# Patient Record
Sex: MALE | Race: WHITE | NOT HISPANIC OR LATINO | Employment: OTHER | URBAN - METROPOLITAN AREA
[De-identification: names, ages, dates, MRNs, and addresses within clinical notes are randomized per-mention and may not be internally consistent; named-entity substitution may affect disease eponyms.]

---

## 2024-07-03 ENCOUNTER — TELEPHONE (OUTPATIENT)
Dept: GASTROENTEROLOGY | Facility: MEDICAL CENTER | Age: 64
End: 2024-07-03

## 2024-07-03 ENCOUNTER — OFFICE VISIT (OUTPATIENT)
Dept: GASTROENTEROLOGY | Facility: MEDICAL CENTER | Age: 64
End: 2024-07-03
Payer: COMMERCIAL

## 2024-07-03 VITALS — WEIGHT: 207.6 LBS

## 2024-07-03 DIAGNOSIS — Z12.11 SCREENING FOR MALIGNANT NEOPLASM OF COLON: Primary | ICD-10-CM

## 2024-07-03 DIAGNOSIS — K58.0 IRRITABLE BOWEL SYNDROME WITH DIARRHEA: ICD-10-CM

## 2024-07-03 DIAGNOSIS — K21.9 GASTROESOPHAGEAL REFLUX DISEASE, UNSPECIFIED WHETHER ESOPHAGITIS PRESENT: ICD-10-CM

## 2024-07-03 PROCEDURE — 99204 OFFICE O/P NEW MOD 45 MIN: CPT | Performed by: STUDENT IN AN ORGANIZED HEALTH CARE EDUCATION/TRAINING PROGRAM

## 2024-07-03 RX ORDER — BUPROPION HYDROCHLORIDE 300 MG/1
300 TABLET ORAL DAILY
COMMUNITY

## 2024-07-03 RX ORDER — LAMOTRIGINE 200 MG/1
200 TABLET ORAL DAILY
COMMUNITY

## 2024-07-03 RX ORDER — FINASTERIDE 1 MG/1
1 TABLET, FILM COATED ORAL DAILY
COMMUNITY

## 2024-07-03 RX ORDER — DULOXETIN HYDROCHLORIDE 30 MG/1
30 CAPSULE, DELAYED RELEASE ORAL DAILY
COMMUNITY

## 2024-07-03 RX ORDER — ATORVASTATIN CALCIUM 20 MG/1
20 TABLET, FILM COATED ORAL DAILY
COMMUNITY

## 2024-07-03 RX ORDER — CLONAZEPAM 0.5 MG/1
0.5 TABLET ORAL 2 TIMES DAILY
COMMUNITY

## 2024-07-03 NOTE — PROGRESS NOTES
[facility] - Outpatient Consultation  Juni Torres 63 y.o. male MRN: [id number]  Encounter: [id number]      Assessment and Plan:    1. Screening for malignant neoplasm of colon    2. Gastroesophageal reflux disease, unspecified whether esophagitis present    3. Irritable bowel syndrome with diarrhea        63 y.o. male w/ hx of JULIET, anxiety, depression who was referred to GI for screening colonoscopy.    Patient is at average risk for CRC and is due for screening colonoscopy.  Risks and benefits were discussed, and we will proceed.    Patient's lifelong abdominal cramping and diarrhea is most consistent with IBS-D, but I will rule out celiac disease for now.    He does not have a strong indication for EGD at this time, but he does admit to longstanding GERD (which is well-controlled at this time).  He does have a few risk factors he does have a few risk factors for Hernandez's, so we will proceed with EGD at the same time as colonoscopy for Hernandez's screening.    - EGD, colonoscopy, Clenpiq  - Celiac serologies    Orders Placed This Encounter   Procedures    Tissue transglutaminase, IgA    IgA    EGD    Colonoscopy     ______________________________________________________________________    History of Present Illness:    Juni Torres is a 63 y.o. male w/ hx of JULIET, anxiety, depression who was referred to GI for screening colonoscopy.    Patient reports a sensitive stomach characterized by frequent cramping and diarrhea after trigger foods (such as tomato sauce).  As long as he avoids such foods, his bowels tend to be normal.  Denies hematochezia, melena, unintentional weight loss.    He also reports longstanding GERD for which he used to use Zantac until it was taken off the market.  Symptoms seem relatively well-controlled with lifestyle changes.  Denies dysphagia, odynophagia, nausea, or vomiting.    No prior EGD.  Last colonoscopy about 10 years ago which was reportedly normal.  No  family history of colon cancer.      Review of Systems:  As per HPI. Otherwise negative.      Historical Information   History reviewed. No pertinent past medical history.  History reviewed. No pertinent surgical history.  Social History   Social History     Substance and Sexual Activity   Alcohol Use Yes    Comment: social     Social History     Substance and Sexual Activity   Drug Use Not on file     Social History     Tobacco Use   Smoking Status Never   Smokeless Tobacco Never     History reviewed. No pertinent family history.    Meds/Allergies       Current Outpatient Medications:     atorvastatin (LIPITOR) 20 mg tablet    buPROPion (WELLBUTRIN XL) 300 mg 24 hr tablet    clonazePAM (KlonoPIN) 0.5 mg tablet    DULoxetine (CYMBALTA) 30 mg delayed release capsule    finasteride (PROPECIA) 1 MG tablet    lamoTRIgine (LaMICtal) 200 MG tablet    sodium picosulfate, magnesium oxide, citric acid (Clenpiq) oral solution    No Known Allergies        Objective     Weight 94.2 kg (207 lb 9.6 oz). There is no height or weight on file to calculate BMI.        Physical Exam:      General: No acute distress  Abdomen: Soft, non-tender, non-distended, normoactive bowel sounds  Neuro: Awake, alert, oriented x 3    Lab Results:   Lab Results   Component Value Date/Time    SODIUM 141 12/15/2023 10:34 AM    K 4.5 12/15/2023 10:34 AM     12/15/2023 10:34 AM    CO2 26 12/15/2023 10:34 AM    BUN 18 12/15/2023 10:34 AM    CREATININE 1.02 12/15/2023 10:34 AM    AST 24 12/15/2023 10:34 AM    ALT 31 12/15/2023 10:34 AM    ALKPHOS 73 12/15/2023 10:34 AM    TBILI 1.0 12/15/2023 10:34 AM    ALB 4.7 12/15/2023 10:34 AM

## 2024-07-03 NOTE — TELEPHONE ENCOUNTER
Procedure: EGD/Colonoscopy  Date: 08/02/2024  Physician performing: Dr. Linares  Location of procedure:  [facility]  Instructions given to patient: Yes  Diabetic: No  Clearances: N/A

## 2024-07-19 ENCOUNTER — ANESTHESIA (OUTPATIENT)
Dept: ANESTHESIOLOGY | Facility: HOSPITAL | Age: 64
End: 2024-07-19

## 2024-07-19 ENCOUNTER — TELEPHONE (OUTPATIENT)
Dept: GASTROENTEROLOGY | Facility: MEDICAL CENTER | Age: 64
End: 2024-07-19

## 2024-07-19 ENCOUNTER — ANESTHESIA EVENT (OUTPATIENT)
Dept: ANESTHESIOLOGY | Facility: HOSPITAL | Age: 64
End: 2024-07-19

## 2024-07-19 NOTE — TELEPHONE ENCOUNTER
Confirming Upcoming Procedure: Colonoscopy & EGD on August 2  Physician performing: Dr. Linares  Location of procedure:  [facility]  Prep: [facility]

## 2024-08-02 ENCOUNTER — HOSPITAL ENCOUNTER (OUTPATIENT)
Dept: GASTROENTEROLOGY | Facility: MEDICAL CENTER | Age: 64
Setting detail: OUTPATIENT SURGERY
End: 2024-08-02
Payer: COMMERCIAL

## 2024-08-02 ENCOUNTER — ANESTHESIA (OUTPATIENT)
Dept: GASTROENTEROLOGY | Facility: MEDICAL CENTER | Age: 64
End: 2024-08-02

## 2024-08-02 ENCOUNTER — ANESTHESIA EVENT (OUTPATIENT)
Dept: GASTROENTEROLOGY | Facility: MEDICAL CENTER | Age: 64
End: 2024-08-02

## 2024-08-02 VITALS
DIASTOLIC BLOOD PRESSURE: 67 MMHG | WEIGHT: 198 LBS | RESPIRATION RATE: 18 BRPM | BODY MASS INDEX: 30.01 KG/M2 | SYSTOLIC BLOOD PRESSURE: 117 MMHG | HEART RATE: 84 BPM | HEIGHT: 68 IN | OXYGEN SATURATION: 95 % | TEMPERATURE: 97.1 F

## 2024-08-02 DIAGNOSIS — Z12.11 SCREENING FOR MALIGNANT NEOPLASM OF COLON: ICD-10-CM

## 2024-08-02 DIAGNOSIS — K21.9 GASTROESOPHAGEAL REFLUX DISEASE, UNSPECIFIED WHETHER ESOPHAGITIS PRESENT: ICD-10-CM

## 2024-08-02 PROBLEM — M25.511 CHRONIC RIGHT SHOULDER PAIN: Status: ACTIVE | Noted: 2018-06-05

## 2024-08-02 PROBLEM — F41.1 GAD (GENERALIZED ANXIETY DISORDER): Status: ACTIVE | Noted: 2023-11-08

## 2024-08-02 PROBLEM — Z79.899 MEDICAL MARIJUANA USE: Status: ACTIVE | Noted: 2023-02-28

## 2024-08-02 PROBLEM — G89.29 CHRONIC RIGHT SHOULDER PAIN: Status: ACTIVE | Noted: 2018-06-05

## 2024-08-02 PROBLEM — F10.90 ALCOHOL USE: Status: ACTIVE | Noted: 2022-10-24

## 2024-08-02 PROBLEM — G47.33 MILD OBSTRUCTIVE SLEEP APNEA: Status: ACTIVE | Noted: 2024-02-08

## 2024-08-02 PROBLEM — Z78.9 ALCOHOL USE: Status: ACTIVE | Noted: 2022-10-24

## 2024-08-02 PROBLEM — F33.1 MODERATE EPISODE OF RECURRENT MAJOR DEPRESSIVE DISORDER (HCC): Status: ACTIVE | Noted: 2023-11-08

## 2024-08-02 PROCEDURE — G0121 COLON CA SCRN NOT HI RSK IND: HCPCS | Performed by: STUDENT IN AN ORGANIZED HEALTH CARE EDUCATION/TRAINING PROGRAM

## 2024-08-02 PROCEDURE — 43239 EGD BIOPSY SINGLE/MULTIPLE: CPT | Performed by: STUDENT IN AN ORGANIZED HEALTH CARE EDUCATION/TRAINING PROGRAM

## 2024-08-02 PROCEDURE — 88305 TISSUE EXAM BY PATHOLOGIST: CPT | Performed by: PATHOLOGY

## 2024-08-02 RX ORDER — SODIUM CHLORIDE 9 MG/ML
INJECTION, SOLUTION INTRAVENOUS CONTINUOUS PRN
Status: DISCONTINUED | OUTPATIENT
Start: 2024-08-02 | End: 2024-08-02

## 2024-08-02 RX ORDER — SODIUM CHLORIDE 9 MG/ML
125 INJECTION, SOLUTION INTRAVENOUS CONTINUOUS
Status: CANCELLED | OUTPATIENT
Start: 2024-08-02

## 2024-08-02 RX ORDER — PROPOFOL 10 MG/ML
INJECTION, EMULSION INTRAVENOUS CONTINUOUS PRN
Status: DISCONTINUED | OUTPATIENT
Start: 2024-08-02 | End: 2024-08-02

## 2024-08-02 RX ORDER — IBUPROFEN 200 MG
200 TABLET ORAL EVERY 6 HOURS PRN
COMMUNITY

## 2024-08-02 RX ORDER — PROPOFOL 10 MG/ML
INJECTION, EMULSION INTRAVENOUS AS NEEDED
Status: DISCONTINUED | OUTPATIENT
Start: 2024-08-02 | End: 2024-08-02

## 2024-08-02 RX ORDER — FENTANYL CITRATE 50 UG/ML
INJECTION, SOLUTION INTRAMUSCULAR; INTRAVENOUS AS NEEDED
Status: DISCONTINUED | OUTPATIENT
Start: 2024-08-02 | End: 2024-08-02

## 2024-08-02 RX ORDER — SODIUM CHLORIDE 9 MG/ML
125 INJECTION, SOLUTION INTRAVENOUS CONTINUOUS
Status: SHIPPED | OUTPATIENT
Start: 2024-08-02

## 2024-08-02 RX ORDER — LIDOCAINE HYDROCHLORIDE 20 MG/ML
INJECTION, SOLUTION EPIDURAL; INFILTRATION; INTRACAUDAL; PERINEURAL AS NEEDED
Status: DISCONTINUED | OUTPATIENT
Start: 2024-08-02 | End: 2024-08-02

## 2024-08-02 RX ADMIN — SODIUM CHLORIDE 125 ML/HR: 0.9 INJECTION, SOLUTION INTRAVENOUS at 10:48

## 2024-08-02 RX ADMIN — PROPOFOL 150 MG: 10 INJECTION, EMULSION INTRAVENOUS at 10:57

## 2024-08-02 RX ADMIN — PROPOFOL 50 MG: 10 INJECTION, EMULSION INTRAVENOUS at 11:02

## 2024-08-02 RX ADMIN — SODIUM CHLORIDE: 0.9 INJECTION, SOLUTION INTRAVENOUS at 10:57

## 2024-08-02 RX ADMIN — PROPOFOL 20 MG: 10 INJECTION, EMULSION INTRAVENOUS at 11:15

## 2024-08-02 RX ADMIN — PROPOFOL 200 MCG/KG/MIN: 10 INJECTION, EMULSION INTRAVENOUS at 11:02

## 2024-08-02 RX ADMIN — LIDOCAINE HYDROCHLORIDE 100 MG: 20 INJECTION, SOLUTION EPIDURAL; INFILTRATION; INTRACAUDAL at 10:57

## 2024-08-02 RX ADMIN — FENTANYL CITRATE 25 MCG: 50 INJECTION INTRAMUSCULAR; INTRAVENOUS at 10:54

## 2024-08-02 NOTE — H&P
History and Physical - [facility]  Juni Torres 63 y.o. male MRN: [id number]          HPI: Juni Torres is a 63 y.o. year old male who presents for EGD/colonoscopy to evaluate GERD/Hernandez's screening and CRC screening, respectively.    Last colonoscopy 10 years ago.      REVIEW OF SYSTEMS: Per the HPI, and otherwise unremarkable.    Historical Information   No past medical history on file.  No past surgical history on file.  Social History   Social History     Substance and Sexual Activity   Alcohol Use Yes    Comment: social     Social History     Substance and Sexual Activity   Drug Use Not on file     Social History     Tobacco Use   Smoking Status Never   Smokeless Tobacco Never     No family history on file.    Meds/Allergies       Current Outpatient Medications:     atorvastatin (LIPITOR) 20 mg tablet    buPROPion (WELLBUTRIN XL) 300 mg 24 hr tablet    clonazePAM (KlonoPIN) 0.5 mg tablet    DULoxetine (CYMBALTA) 30 mg delayed release capsule    finasteride (PROPECIA) 1 MG tablet    lamoTRIgine (LaMICtal) 200 MG tablet    sodium picosulfate, magnesium oxide, citric acid (Clenpiq) oral solution    No Known Allergies    Objective     There were no vitals taken for this visit.      PHYSICAL EXAM    GEN: NAD  CARDIO: RRR  PULM: CTA bilaterally  ABD: soft, non-tender, non-distended  EXT: no lower extremity edema  NEURO: AAOx3      ASSESSMENT/PLAN:  63 y.o. year old male here for EGD/colonoscopy; he is stable and optimized for his procedure.

## 2024-08-02 NOTE — ANESTHESIA PREPROCEDURE EVALUATION
"Procedure:  EGD  COLONOSCOPY     - denies any chest pain, palpitations, shortness of breath, syncope, lightheadedness, seizures   - denies any recent infectious symptoms such as fevers, chills, cough   - denies taking any anticoagulation medications or any issues with bleeding, bruising, clotting    Relevant Problems   ANESTHESIA (within normal limits)      CARDIO (within normal limits)      ENDO (within normal limits)      GI/HEPATIC (within normal limits)      /RENAL (within normal limits)      GYN (within normal limits)      HEMATOLOGY (within normal limits)      MUSCULOSKELETAL (within normal limits)      NEURO/PSYCH (within normal limits)   (+) EDUARDO (generalized anxiety disorder)      PULMONARY (within normal limits)   (+) Mild obstructive sleep apnea      Behavioral Health   (+) Alcohol use   (+) Medical marijuana use          No results found for: \"WBC\", \"HGB\", \"HCT\", \"MCV\", \"PLT\"\  Lab Results   Component Value Date    SODIUM 141 12/15/2023    K 4.5 12/15/2023     12/15/2023    CO2 26 12/15/2023    AGAP 11 12/15/2023    BUN 18 12/15/2023    CREATININE 1.02 12/15/2023    GLUC 105 (H) 12/15/2023    CALCIUM 9.5 12/15/2023    AST 24 12/15/2023    ALT 31 12/15/2023    ALKPHOS 73 12/15/2023    TP 7.0 12/15/2023    TBILI 1.0 12/15/2023    EGFR 81 12/15/2023     No results found for: \"PTT\"  No results found for: \"INR\", \"PROTIME\"    Physical Exam    Airway    Mallampati score: II  TM Distance: >3 FB  Neck ROM: full     Dental       Cardiovascular  Rhythm: regular, Rate: normal, Cardiovascular exam normal    Pulmonary  Pulmonary exam normal Breath sounds clear to auscultation    Other Findings        Anesthesia Plan  ASA Score- 2     Anesthesia Type- IV sedation with anesthesia with ASA Monitors.         Additional Monitors:     Airway Plan:            Plan Factors-Exercise tolerance (METS): >4 METS.    Chart reviewed. EKG reviewed. Imaging results reviewed. Existing labs reviewed. Patient summary " reviewed.    Patient is not a current smoker.  Patient did not smoke on day of surgery.    Obstructive sleep apnea risk education given perioperatively.        Induction- intravenous.    Postoperative Plan-         Informed Consent- Anesthetic plan and risks discussed with patient.  I personally reviewed this patient with the CRNA. Discussed and agreed on the Anesthesia Plan with the CRNA..

## 2024-08-08 PROCEDURE — 88305 TISSUE EXAM BY PATHOLOGIST: CPT | Performed by: PATHOLOGY

## 2024-12-03 ENCOUNTER — OFFICE VISIT (OUTPATIENT)
Dept: GASTROENTEROLOGY | Facility: MEDICAL CENTER | Age: 64
End: 2024-12-03
Payer: COMMERCIAL

## 2024-12-03 VITALS
TEMPERATURE: 98.3 F | DIASTOLIC BLOOD PRESSURE: 82 MMHG | OXYGEN SATURATION: 98 % | HEIGHT: 68 IN | WEIGHT: 200.2 LBS | HEART RATE: 97 BPM | BODY MASS INDEX: 30.34 KG/M2 | SYSTOLIC BLOOD PRESSURE: 130 MMHG

## 2024-12-03 DIAGNOSIS — K58.0 IRRITABLE BOWEL SYNDROME WITH DIARRHEA: Primary | ICD-10-CM

## 2024-12-03 PROCEDURE — 99214 OFFICE O/P EST MOD 30 MIN: CPT | Performed by: STUDENT IN AN ORGANIZED HEALTH CARE EDUCATION/TRAINING PROGRAM

## 2024-12-03 RX ORDER — LORAZEPAM 0.5 MG/1
TABLET ORAL
COMMUNITY
Start: 2024-10-23

## 2024-12-03 NOTE — PROGRESS NOTES
[facility] - Outpatient Consultation  Juni Torres 64 y.o. male MRN: [id number]  Encounter: [id number]      Assessment and Plan:    1. Irritable bowel syndrome with diarrhea        64 y.o. male w/ hx of JULIET, anxiety, depression who presents for follow-up.    Patient's lifelong abdominal cramping and diarrhea is most consistent with IBS-D. I reordered celiac serologies ordered at the last visit and ordered fecal elastase. We discussed fiber supplementation and a low FODMAP diet.    - Celiac serologies  - Fecal elastase  - Low FODMAP diet  - Fiber supplementation    Orders Placed This Encounter   Procedures    Pancreatic elastase, fecal    Tissue Transglutaminase, IgA    IgA     ______________________________________________________________________    History of Present Illness:    Juni Torres is a 64 y.o. male w/ hx of JULIET, anxiety, depression who presents for follow-up.    Patient reports a sensitive stomach characterized by frequent cramping and diarrhea after trigger foods (such as tomato sauce) which seemed to worsen for a while after recent colonoscopy. Currently, symptoms are better with resolution of symptoms as long as he avoids trigger foods.     He also reports longstanding GERD for which he used to use Zantac until it was taken off the market.  Symptoms seem relatively well-controlled with lifestyle changes.      No family history of colon cancer.    Prior endoscopic evaluation:    EGD 8/2024: inlet patch in upper esophagus, 1 cm HH, antral erythema with hematin, FGPs, pinpoint erosion in duodenal bulb (neg H.pylori)    Colon 8/2024: Sigmoid diverticulosis, large protruding internal and external hemorrhoids    Review of Systems:  As per HPI. Otherwise negative.      Historical Information   Past Medical History:   Diagnosis Date    Anxiety     Depression     Hyperlipidemia     Hypertension      History reviewed. No pertinent surgical history.  Social History   Social  "History     Substance and Sexual Activity   Alcohol Use Yes    Alcohol/week: 14.0 standard drinks of alcohol    Types: 7 Glasses of wine, 7 Cans of beer per week    Comment: two drinks per night     Social History     Substance and Sexual Activity   Drug Use Not Currently    Types: Marijuana     Social History     Tobacco Use   Smoking Status Never   Smokeless Tobacco Never     History reviewed. No pertinent family history.    Meds/Allergies       Current Outpatient Medications:     atorvastatin (LIPITOR) 20 mg tablet    buPROPion (WELLBUTRIN XL) 300 mg 24 hr tablet    Cholecalciferol 125 MCG (5000 UT) capsule    clonazePAM (KlonoPIN) 0.5 mg tablet    DULoxetine (CYMBALTA) 30 mg delayed release capsule    finasteride (PROPECIA) 1 MG tablet    ibuprofen (MOTRIN) 200 mg tablet    lamoTRIgine (LaMICtal) 200 MG tablet    LORazepam (ATIVAN) 0.5 mg tablet    No Known Allergies        Objective     Blood pressure 130/82, pulse 97, temperature 98.3 °F (36.8 °C), temperature source Tympanic, height 5' 8\" (1.727 m), weight 90.8 kg (200 lb 3.2 oz), SpO2 98%. Body mass index is 30.44 kg/m².        Physical Exam:      General: No acute distress  Abdomen: Soft, non-tender, non-distended, normoactive bowel sounds  Neuro: Awake, alert, oriented x 3    Lab Results:   Lab Results   Component Value Date/Time    SODIUM 140 11/23/2024 09:30 AM    K 4.3 11/23/2024 09:30 AM     11/23/2024 09:30 AM    CO2 30 11/23/2024 09:30 AM    BUN 17 11/23/2024 09:30 AM    CREATININE 0.99 11/23/2024 09:30 AM    AST 18 11/23/2024 09:30 AM    ALT 23 11/23/2024 09:30 AM    ALKPHOS 78 11/23/2024 09:30 AM    TBILI 1.1 (H) 11/23/2024 09:30 AM    BILIDIR 0.2 11/27/2024 12:17 PM    ALB 4.8 11/23/2024 09:30 AM       "

## 2025-05-02 ENCOUNTER — TELEPHONE (OUTPATIENT)
Dept: GASTROENTEROLOGY | Facility: MEDICAL CENTER | Age: 65
End: 2025-05-02

## 2025-06-10 ENCOUNTER — NURSE TRIAGE (OUTPATIENT)
Age: 65
End: 2025-06-10

## 2025-06-10 DIAGNOSIS — R19.7 DIARRHEA, UNSPECIFIED TYPE: Primary | ICD-10-CM

## 2025-06-10 RX ORDER — DICYCLOMINE HCL 20 MG
20 TABLET ORAL EVERY 6 HOURS
Qty: 28 TABLET | Refills: 0 | Status: SHIPPED | OUTPATIENT
Start: 2025-06-10

## 2025-06-10 NOTE — TELEPHONE ENCOUNTER
REASON FOR CONVERSATION: Abdominal Pain    SYMPTOMS: abdominal pain/cramping, diarrhea     OTHER HEALTH INFORMATION: hx of IBS D, taking imodium otc with little relief, did not do celiac blood work or stool test, pt. To get that done this week     PROTOCOL DISPOSITION: 72 Hour Provider Response    CARE ADVICE PROVIDED: ordered Bentyl for abdominal cramping and diarrhea, avoid triggering foods     PRACTICE FOLLOW-UP: scheduled OV 6/24/25       Dicyclomine:      <65 years old: Dicyclomine 20mg PO every six hours as needed, quantity of 28 total pills, with no refills                                                                      Additional Information   Affirmative: The patient has moderate abdominal pain, no antispasmodic medications    Answer Assessment - Initial Assessment Questions  1. When was your last bowel movement? What is the consistency, volume and how frequently are you going or when was the last time you went?  LBM yesterday, soft formed stool   2. Are your bowel movements associated with meals?   Denies   3. Do you have abdominal pain with bowel movements, without?   Yes, both   4. If pain is present, when did it start, did the pain recently worsen or stay the same? Does anything make it feel better?   Ongoing, imodium helps with diarrhea and abdominal pain   5. Where is your pain located, does it radiate, is it constant, intermittent, or does it occur randomly?    Lower abdomen   6. How bad is the pain?   8/10 cramping   -MILD (1-3): does not interfere with normal activities, abdomen soft and not tender to touch   -MODERATE (4-7): interferes with normal activities or awakens from sleep, tender to touch   -SEVERE (8-10): excruciating pain, doubled over, unable to do any normal activities   7. Do you have any fecal urgency (the need to use the bathroom without having a bowel movement?)   Denies   8. Do you feel like you have completely emptied your bowels?   Yes   9. Are you passing any black or  bloody stools?   Denies   10. Are you taking any over the counter (OTC) or prescribed medications? If so, names, dosage, frequency?   Imodium otc   11. Have you had any recent diet/ lifestyle modifications (life stressors)?   Pt. Ate tomato sauce and pasta and felt this could have triggered symptoms   12. Have you recently had stool studies within the last 3 months (C Diff, Ova parasite, Stool bacterial) ?  N/a   13. Are you following the LOW Fodmap diet?   Denies   14. Do you have narrow angle glaucoma?   Denies   15. For diarrhea, have you ever tried Xifaxan and did it work?   Denies    Protocols used: GI-IBS (irritable bowel syndrome)-ADULT-[place]

## 2025-06-11 ENCOUNTER — APPOINTMENT (OUTPATIENT)
Dept: LAB | Facility: CLINIC | Age: 65
End: 2025-06-11
Payer: COMMERCIAL

## 2025-06-11 DIAGNOSIS — K58.0 IRRITABLE BOWEL SYNDROME WITH DIARRHEA: ICD-10-CM

## 2025-06-11 PROCEDURE — 86364 TISS TRNSGLTMNASE EA IG CLAS: CPT

## 2025-06-11 PROCEDURE — 36415 COLL VENOUS BLD VENIPUNCTURE: CPT

## 2025-06-16 LAB — TTG IGA SER IA-ACNC: <0.4 U/ML (ref ?–10)

## 2025-07-01 ENCOUNTER — APPOINTMENT (OUTPATIENT)
Dept: LAB | Facility: CLINIC | Age: 65
End: 2025-07-01
Payer: COMMERCIAL

## 2025-07-01 DIAGNOSIS — K58.0 IRRITABLE BOWEL SYNDROME WITH DIARRHEA: ICD-10-CM

## 2025-07-01 PROCEDURE — 82653 EL-1 FECAL QUANTITATIVE: CPT

## 2025-07-02 LAB — ELASTASE PANC STL-MCNT: 750 UG/G

## 2025-07-08 ENCOUNTER — OFFICE VISIT (OUTPATIENT)
Dept: GASTROENTEROLOGY | Facility: MEDICAL CENTER | Age: 65
End: 2025-07-08
Payer: COMMERCIAL

## 2025-07-08 VITALS
TEMPERATURE: 98 F | DIASTOLIC BLOOD PRESSURE: 77 MMHG | HEART RATE: 111 BPM | SYSTOLIC BLOOD PRESSURE: 139 MMHG | BODY MASS INDEX: 31.37 KG/M2 | WEIGHT: 207 LBS | OXYGEN SATURATION: 98 % | HEIGHT: 68 IN

## 2025-07-08 DIAGNOSIS — K58.0 IRRITABLE BOWEL SYNDROME WITH DIARRHEA: Primary | ICD-10-CM

## 2025-07-08 PROCEDURE — 99214 OFFICE O/P EST MOD 30 MIN: CPT | Performed by: NURSE PRACTITIONER

## 2025-07-08 NOTE — PROGRESS NOTES
"Name: Juni Torres      : 1960      MRN: [id number]  Encounter Provider: RAVINDER Hurst  Encounter Date: 2025   Encounter department: [facility]  :  Assessment & Plan  Irritable bowel syndrome with diarrhea  History of IBS-D.  Recent was placed on Bentyl as needed for increased abdominal pain after BM.  Took 1 dose and reports he \"felt worse.\"  He has not taken it since that time.  He is using Imodium as needed which does help with his loose stools as well as his abdominal pain.  He has not started a fiber supplement daily.  We did discuss fiber supplementation and how to take it.  He has never tried a low FODMAP diet.  Instructed him to start with a fiber supplement for a few weeks and if things do not improve to add the low FODMAP diet.  Recent stool elastase and celiac testing were negative.    Fiber supplement daily  If no help with fiber supplementation over several weeks, start low FODMAP diet  Follow-up in office in 4 months or sooner if needed  Could consider repeat colonoscopy with colon biopsies to rule out microscopic colitis if symptoms do not improve           History of Present Illness   Juni Torres is a 64 y.o. male who presents for follow-up.  He was last seen by Dr. Linares  for IBS with diarrhea.    HPI    History of lifelong abdominal cramping and diarrhea most consistent with IBS/D.  Fiber supplement and low FODMAP diet was recommended at last visit.    Interval history: Stool elastase and celiac testing were negative.  Reporting intermittent bouts of generalized abdominal cramping that is better with a BM.  BMs are loose several times per day.  No melena or hematochezia.  He was given Bentyl several weeks ago and took 1 dose and reports he \"felt worse.\"  He is using Imodium as needed which is helping his pain as well as the frequency of his stools.  No melena or hematochezia.  He is up-to-date on colon cancer screening.  No " weight loss.    Prior EGD/colonoscopy     EGD 8/2024: inlet patch in upper esophagus, 1 cm HH, antral erythema with hematin, FGPs, pinpoint erosion in duodenal bulb (neg H.pylori)     Colon 8/2024: Sigmoid diverticulosis, large protruding internal and external hemorrhoids.  Biopsies of the colon not obtained during this exam.             Review of Systems   Constitutional:  Negative for chills and fever.   HENT:  Negative for ear pain and sore throat.    Eyes:  Negative for pain and visual disturbance.   Respiratory:  Negative for cough and shortness of breath.    Cardiovascular:  Negative for chest pain and palpitations.   Gastrointestinal:  Positive for abdominal pain and diarrhea. Negative for vomiting.   Genitourinary:  Negative for dysuria and hematuria.   Musculoskeletal:  Negative for arthralgias and back pain.   Skin:  Negative for color change and rash.   Neurological:  Negative for seizures and syncope.   All other systems reviewed and are negative.   A complete review of systems is negative other than that noted above in the HPI.      Current Medications[1]  Objective   There were no vitals taken for this visit.    Physical Exam  Vitals and nursing note reviewed.   Constitutional:       General: He is not in acute distress.     Appearance: He is well-developed.   HENT:      Head: Normocephalic and atraumatic.     Eyes:      Conjunctiva/sclera: Conjunctivae normal.       Cardiovascular:      Rate and Rhythm: Normal rate and regular rhythm.      Heart sounds: No murmur heard.  Pulmonary:      Effort: Pulmonary effort is normal. No respiratory distress.      Breath sounds: Normal breath sounds.   Abdominal:      General: Bowel sounds are normal.      Palpations: Abdomen is soft.      Tenderness: There is no abdominal tenderness.     Musculoskeletal:         General: No swelling.      Cervical back: Neck supple.     Skin:     General: Skin is warm and dry.      Capillary Refill: Capillary refill takes less  than 2 seconds.     Neurological:      Mental Status: He is alert and oriented to person, place, and time.     Psychiatric:         Mood and Affect: Mood normal.            Lab Results: I personally reviewed relevant lab results.       Results for orders placed during the hospital encounter of 08/02/24    Colonoscopy    Impression  Diverticulosis of mild severity in the sigmoid colon  Large, protruding hemorrhoids  Otherwise normal colonoscopy        RECOMMENDATION:  Repeat screening colonoscopy in 10 years, due: 7/31/2034            Saroj Linares MD               [1]   Current Outpatient Medications   Medication Sig Dispense Refill    atorvastatin (LIPITOR) 20 mg tablet Take 20 mg by mouth daily      buPROPion (WELLBUTRIN XL) 300 mg 24 hr tablet Take 300 mg by mouth daily      Cholecalciferol 125 MCG (5000 UT) capsule Take 5,000 Units by mouth daily      clonazePAM (KlonoPIN) 0.5 mg tablet Take 0.5 mg by mouth 2 (two) times a day prn      dicyclomine (BENTYL) 20 mg tablet Take 1 tablet (20 mg total) by mouth every 6 (six) hours as needed 28 tablet 0    DULoxetine (CYMBALTA) 30 mg delayed release capsule Take 30 mg by mouth daily 2 x a day      finasteride (PROPECIA) 1 MG tablet Take 1 mg by mouth daily      ibuprofen (MOTRIN) 200 mg tablet Take 200 mg by mouth every 6 (six) hours as needed for mild pain      lamoTRIgine (LaMICtal) 200 MG tablet Take 200 mg by mouth daily      LORazepam (ATIVAN) 0.5 mg tablet TAKE 1 TABLET BY MOUTH 2 TIMES A DAY AS NEEDED FOR ANXIETY (SLEEP/INSOMNIA)       No current facility-administered medications for this visit.

## 2025-07-08 NOTE — PATIENT INSTRUCTIONS
Start fiber supplement daily (Benefiber, Citrucel or Metamucil)  Contact office in a few weeks to let us know how you are feeling  Can use Bentyl (dicyclomine) as needed for pain  Can use Imodium as needed  If fiber supplement does not help after several weeks you can start a low FODMAP diet  Contact our office if symptoms worsen or are not improved with above recommendations